# Patient Record
Sex: FEMALE | Race: WHITE | NOT HISPANIC OR LATINO | URBAN - METROPOLITAN AREA
[De-identification: names, ages, dates, MRNs, and addresses within clinical notes are randomized per-mention and may not be internally consistent; named-entity substitution may affect disease eponyms.]

---

## 2018-08-07 NOTE — TELEPHONE ENCOUNTER
FUTURE VISIT INFORMATION      FUTURE VISIT INFORMATION:    Date: 8/10/18    Time: 1340    Location: [facility]  REFERRAL INFORMATION:    Referring provider:  UNKNOWN    Referring providers clinic:  UNKNOWN    Reason for visit/diagnosis  DDD    RECORDS REQUESTED FROM:       Clinic name Comments Records Status Imaging Status                                         RECORDS STATUS      RECORDS RECEIVED FROM: [place]   DATE RECEIVED: 8/7/18   NOTES STATUS DETAILS   OFFICE NOTE from referring provider N/A    OFFICE NOTE from other specialist Care Everywhere 11/1/13-8/10/16   DISCHARGE SUMMARY from hospital N/A    DISCHARGE REPORT from the ER N/A    OPERATIVE REPORT RECEIVED April 2016 November 2013 December 2014     MEDICATION LIST Care Everywhere    IMPLANT RECORD/STICKER N/A    LABS     CBC/DIFF N/A    CULTURES N/A    DEXA N/A    MRI RECEIVED 7/27/17*10/13/16*6/5/16*5/19/16*3/17/16*10/28/14*5/7/13*   CT SCAN RECEIVED 10/8/10   XRAYS (IMAGES & REPORTS) RECEIVED 8/10/16*7/11/16*4/21/16*3/14/16*12/16/14*11/4/13*10/30/13*3/5/13*   TUMOR     PATHOLOGY  Slides & report N/A

## 2018-08-09 DIAGNOSIS — M51.369 DDD (DEGENERATIVE DISC DISEASE), LUMBAR: Primary | ICD-10-CM

## 2018-08-09 NOTE — PROGRESS NOTES
Spine Surgery Consultation    REFERRING PHYSICIAN: Referred Self   PRIMARY CARE PHYSICIAN: Tad Ramirez           Chief Complaint:   Low back and right leg pain    History of Present Illness:  Symptom Profile Including: location of symptoms, onset, severity, exacerbating/alleviating factors, previous treatments:        Saige Haddad is a 38 year old female who presents for evaluation of the above.  The patient has long-standing low back pain since the age of 16.  She has been treated over the years with various courses of oral pain medications, with good relief for periods of time.  She developed significant low back pain extending down her lateral thigh and lateral leg about 5-6 years ago.  She developed some numbness and tingling and was diagnosed with a L5-S1 disc herniation.  She underwent a right L5 partial hemilaminectomy and microdiscectomy with Dr. mcdaniels in the [place] system in 2013.  She initially did well after the surgery, developed recurrence of her symptoms.  She underwent redo of her partial hemilaminectomy, and discectomy in 2014.  She reports that again she did well after the surgery, but they have had a recurrence of her symptoms.  She underwent revision decompression and microdiscectomy in 2016, and had no relief of her symptoms after this.    Since that time she continues to have right-sided low back pain extending down her lateral thigh and lateral leg to the dorsum of her foot into the great toe in an L5 distribution.  It is worse with activity and ambulation, and better with rest.  She does note some diffuse numbness in her calf, and the entirety of her foot.  She reports that her problem is 50% low back pain, and 50% leg pain.  She has been treated with massage therapy, and has been seen in a pain clinic.  She reports she has gotten various injections most recently about 1 year ago.  She reports transient about 1-2 hour relief of symptoms after an injection, but no lasting relief.   "She comes in today for further recommendations for her low back and leg pain.  She has chronic diarrhea, but denies any issues with bowel or bladder incontinence.  She has not had any recent weight loss or weight gain.         Past Medical History:   Patient denies any significant past medical history.         Past Surgical History:   As noted above previous L5-S1 hemilaminectomy and discectomies.  Tubal ligation  No other intra-abdominal surgeries.         Social History:   She smokes 1 pack per day of cigarettes, uses alcohol occasionally.  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  She denies any other drug use.  Patient was recently laid off from her job and [employer].  She has a relatively unstable housing situation, and is bouncing between 3 different friends houses.    The patient does note that recently she lost her job.  She has been dealing with some anger and psychiatric issues, and is scheduled to see a psychiatrist next week.         Family History:   Patient denies any problems with anesthesia, bleeding or clotting.         Allergies:   No known drug allergies         Medications:   No current medications.            Review of Systems:     A 10 point ROS was performed and reviewed. Specific responses to these questions are noted at the end of the document.         Physical Exam:   Vitals: Ht 1.676 m (5' 6\")  Wt 93.4 kg (205 lb 14.4 oz)  BMI 33.23 kg/m2  Constitutional: awake, alert, cooperative, no apparent distress, appears stated age.    Eyes: The sclera are white.  Ears, Nose, Throat: The trachea is midline.  Psychiatric: The patient has a normal affect.  Respiratory: breathing non-labored  Cardiovascular: The extremities are warm and perfused.  Skin: no obvious rashes or lesions.  Musculoskeletal, Neurologic, and Spine:   Cervical spine:       Lumbar Spine:    Appearance - No gross stepoffs or deformities    Motor -     L2-3: Hip flexion 5/5 L and 5/5 R " strength          L3/4:  Knee extension L 5/5 and R 5/5 strength         L4/5:  Foot dorsiflexion R 5/5 L 5/5 and       EHL dorsiflexion R 4/5 L 4/5 strength         S1:  Plantarflexion/Peroneal Muscles  L 5/5 and R 5/5 strength    Sensation: intact to light touch L3-S1 distribution BLE, patient reports decreased sensation diffusely in the right lower extremity in the L4, L5, and S1 distributions.      Neurologic:      REFLEXES Left Right   Patella 1+ 1+   Ankle jerk 1+ 1+   Babinski No upgoing great toe No upgoing great toe   Clonus 0 beats 0 beats     Hip Exam:  No pain with hip log roll and no tenderness over the greater trochanters.    Alignment:  Patient stands with a neutral standing sagittal balance.    Tender to palpation over the right PSIS.  Negative Fabricio, thigh thrust, pelvic gapping.    Positive passive straight leg raise on the right for L5 pain.           Imaging:   We ordered and independently reviewed new radiographs at this clinic visit. The results were discussed with the patient.  Findings include:    AP and lateral of the lumbar spine with flexion-extension views from today's date are reviewed.  There is disc space narrowing at L5-S1 and L4-L5.  No significant listhesis.  Posterior spondylosis.    7/27/2017 Lumbar MRI: Severe spondylosis L5-S1 with complete disc space height loss and severe right greater than left foraminal stenosis.  Moderate spondylosis L4-5 without severe stenosis.             Assessment and Plan:   Assessment:  38 year old female with L5-S1 spondylosis and severe right greater than left foraminal stenosis, right L5 radiculopathy.     Plan:  1. Dr. Duong had a good discussion with patient about her findings today.  We reviewed her imaging with her and discussed the likely etiology of her back pain and leg pain.  2. Dr. Duong explained the foraminal stenosis, and explain why the patient likely has not seen significant improvement with microdiscectomy alone.  I think that  given her severe foraminal stenosis that she will not have relief of her leg pain symptoms without either a facetectomy or interbody device, both of which would require a fusion.  Thus, a further revision decompression is not indicated.  3. Patient has undergone conservative management and continues to have radiculopathy  4. Given the patient's continued foraminal stenosis, a L5-S1 anterior lumbar interbody fusion, percutaneous pedicle screw and frederic placement is indicated.  We discussed that this procedure is mainly done for leg pain.  It may help with her back pain given her spondylosis, but this is less predictable than relief of the leg pain.  We discussed that it also may help some of her numbness, but that it is possible that the nerve is been damaging that this would not significantly improve.  Dr. Duong discussed the risk benefits and alternatives to the above procedure.  5. Specifically the risks include the risk of general anesthesia, bleeding, infection, damage to intra-abdominal contents, failure to fuse, nerve root damage, spinal fluid leak, and failure of the patient's symptoms to completely resolved.    6. The patient would like to proceed with surgery. We will refer the patient to the PAC clinic for preoperative evaluation.  We will also update her MRI and get a CT given her MRIs from over one year ago.    7. Discussed smoking cessation with the patient. The patient will need to be off of nicotine for 3 weeks prior to surgery, and we encouraged her to continue this in the 6 month postoperative period to increase her rate of fusion.  The patient understands, and indicated that she will in fact quit smoking.  8. Dr. Duong will see the patient back before surgery to answer any questions and further discuss the details.  The patient was in agreement with this plan and all questions were answered.    The patient was seen and discussed with staff surgeon Dr. Duong who was in agreement with the above  assessment and plan.     Shabbir Wren MD  PGY-4  Orthopaedic Surgery  [phone]    Attending MD (Dr. Erick Duong) :  I reviewed and verified the history and physical exam of the patient and discussed the patient's management with the other clinical providers involved in this patient's care including any involved residents or physicians assistants. I reviewed the above note and agree with the documented findings and plan of care, which were communicated to the patient.      Erick Duong MD      Answers for HPI/ROS submitted by the patient on 8/10/2018   General Symptoms: Yes  Skin Symptoms: No  HENT Symptoms: No  EYE SYMPTOMS: No  HEART SYMPTOMS: No  LUNG SYMPTOMS: No  INTESTINAL SYMPTOMS: Yes  URINARY SYMPTOMS: No  GYNECOLOGIC SYMPTOMS: Yes  BREAST SYMPTOMS: No  SKELETAL SYMPTOMS: Yes  BLOOD SYMPTOMS: No  NERVOUS SYSTEM SYMPTOMS: Yes  MENTAL HEALTH SYMPTOMS: Yes  Fever: No  Loss of appetite: No  Weight loss: No  Weight gain: Yes  Fatigue: Yes  Night sweats: No  Chills: No  Increased stress: Yes  Excessive hunger: Yes  Excessive thirst: No  Feeling hot or cold when others believe the temperature is normal: No  Loss of height: No  Post-operative complications: No  Surgical site pain: No  Hallucinations: No  Change in or Loss of Energy: No  Hyperactivity: No  Confusion: No  Heart burn or indigestion: Yes  Nausea: Yes  Vomiting: No  Abdominal pain: No  Bloating: Yes  Constipation: No  Diarrhea: Yes  Blood in stool: No  Black stools: No  Rectal or Anal pain: No  Fecal incontinence: No  Yellowing of skin or eyes: No  Vomit with blood: No  Change in stools: No  Back pain: Yes  Muscle aches: Yes  Neck pain: No  Swollen joints: No  Joint pain: No  Bone pain: No  Muscle cramps: Yes  Muscle weakness: No  Joint stiffness: No  Bone fracture: No  Trouble with coordination: No  Dizziness or trouble with balance: No  Fainting or black-out spells: No  Memory loss: No  Headache: Yes  Seizures: No  Speech  problems: No  Tingling: Yes  Tremor: No  Weakness: Yes  Difficulty walking: Yes  Paralysis: No  Numbness: Yes  Bleeding or spotting between periods: Yes  Heavy or painful periods: No  Irregular periods: Yes  Vaginal discharge: Yes  Hot flashes: Yes  Vaginal dryness: No  Genital ulcers: No  Reduced libido: No  Painful intercourse: No  Difficulty with sexual arousal: No  Post-menopausal bleeding: No  Nervous or Anxious: No  Depression: No  Trouble sleeping: No  Trouble thinking or concentrating: No  Mood changes: Yes  Panic attacks: No  PHQ-2 Score: 4  If you checked off any problems, how difficult have these problems made it for you to do your work, take care of things at home, or get along with other people?: Very difficult  PHQ9 TOTAL SCORE: 17

## 2018-08-10 ENCOUNTER — OFFICE VISIT (OUTPATIENT)
Dept: ORTHOPEDICS | Facility: CLINIC | Age: 38
End: 2018-08-10
Payer: COMMERCIAL

## 2018-08-10 ENCOUNTER — RADIANT APPOINTMENT (OUTPATIENT)
Dept: GENERAL RADIOLOGY | Facility: CLINIC | Age: 38
End: 2018-08-10
Attending: ORTHOPAEDIC SURGERY
Payer: COMMERCIAL

## 2018-08-10 ENCOUNTER — PRE VISIT (OUTPATIENT)
Dept: ORTHOPEDICS | Facility: CLINIC | Age: 38
End: 2018-08-10

## 2018-08-10 VITALS — BODY MASS INDEX: 33.09 KG/M2 | WEIGHT: 205.9 LBS | HEIGHT: 66 IN

## 2018-08-10 DIAGNOSIS — Z13.0 SCREENING FOR ENDOCRINE, NUTRITIONAL, METABOLIC AND IMMUNITY DISORDER: ICD-10-CM

## 2018-08-10 DIAGNOSIS — Z13.29 SCREENING FOR ENDOCRINE, NUTRITIONAL, METABOLIC AND IMMUNITY DISORDER: ICD-10-CM

## 2018-08-10 DIAGNOSIS — Z13.21 SCREENING FOR ENDOCRINE, NUTRITIONAL, METABOLIC AND IMMUNITY DISORDER: ICD-10-CM

## 2018-08-10 DIAGNOSIS — Z13.228 SCREENING FOR ENDOCRINE, NUTRITIONAL, METABOLIC AND IMMUNITY DISORDER: ICD-10-CM

## 2018-08-10 DIAGNOSIS — M48.062 SPINAL STENOSIS, LUMBAR REGION, WITH NEUROGENIC CLAUDICATION: Primary | ICD-10-CM

## 2018-08-10 DIAGNOSIS — M48.062 SPINAL STENOSIS, LUMBAR REGION, WITH NEUROGENIC CLAUDICATION: ICD-10-CM

## 2018-08-10 DIAGNOSIS — M51.369 DDD (DEGENERATIVE DISC DISEASE), LUMBAR: ICD-10-CM

## 2018-08-10 LAB
MRSA DNA SPEC QL NAA+PROBE: NEGATIVE
SPECIMEN SOURCE: NORMAL

## 2018-08-10 RX ORDER — IBUPROFEN 200 MG
1 TABLET ORAL
COMMUNITY
Start: 2016-06-20

## 2018-08-10 RX ORDER — CHOLECALCIFEROL (VITAMIN D3) 50 MCG
2000 TABLET ORAL DAILY
Qty: 100 TABLET | Refills: 3 | Status: SHIPPED | OUTPATIENT
Start: 2018-08-10

## 2018-08-10 RX ORDER — CALCIUM CARBONATE 500(1250)
1 TABLET ORAL DAILY
Qty: 90 TABLET | Refills: 1 | Status: SHIPPED | OUTPATIENT
Start: 2018-08-10

## 2018-08-10 ASSESSMENT — ENCOUNTER SYMPTOMS
NUMBNESS: 1
MUSCLE WEAKNESS: 0
NECK PAIN: 0
TREMORS: 0
POLYDIPSIA: 0
WEIGHT GAIN: 1
JAUNDICE: 0
ARTHRALGIAS: 0
CONSTIPATION: 0
WEIGHT LOSS: 0
VOMITING: 0
MYALGIAS: 1
DECREASED CONCENTRATION: 0
DECREASED APPETITE: 0
ABDOMINAL PAIN: 0
WEAKNESS: 1
NAUSEA: 1
DEPRESSION: 0
SEIZURES: 0
INSOMNIA: 0
DECREASED LIBIDO: 0
JOINT SWELLING: 0
HALLUCINATIONS: 0
MEMORY LOSS: 0
TINGLING: 1
RECTAL PAIN: 0
NIGHT SWEATS: 0
HEARTBURN: 1
INCREASED ENERGY: 0
CHILLS: 0
STIFFNESS: 0
DIZZINESS: 0
DIARRHEA: 1
HEADACHES: 1
FEVER: 0
LOSS OF CONSCIOUSNESS: 0
PARALYSIS: 0
HOT FLASHES: 1
PANIC: 0
BLOOD IN STOOL: 0
MUSCLE CRAMPS: 1
POLYPHAGIA: 1
DISTURBANCES IN COORDINATION: 0
ALTERED TEMPERATURE REGULATION: 0
BACK PAIN: 1
SPEECH CHANGE: 0
NERVOUS/ANXIOUS: 0
BOWEL INCONTINENCE: 0
FATIGUE: 1
BLOATING: 1

## 2018-08-10 ASSESSMENT — PATIENT HEALTH QUESTIONNAIRE - PHQ9
SUM OF ALL RESPONSES TO PHQ QUESTIONS 1-9: 17
SUM OF ALL RESPONSES TO PHQ QUESTIONS 1-9: 17
10. IF YOU CHECKED OFF ANY PROBLEMS, HOW DIFFICULT HAVE THESE PROBLEMS MADE IT FOR YOU TO DO YOUR WORK, TAKE CARE OF THINGS AT HOME, OR GET ALONG WITH OTHER PEOPLE: VERY DIFFICULT

## 2018-08-10 NOTE — LETTER
8/10/2018      RE: Saige Haddad  [id number]  [facility] [address]       Spine Surgery Consultation    REFERRING PHYSICIAN: Referred Self   PRIMARY CARE PHYSICIAN: Tad Ramirez           Chief Complaint:   Low back and right leg pain    History of Present Illness:  Symptom Profile Including: location of symptoms, onset, severity, exacerbating/alleviating factors, previous treatments:        Saige Haddad is a 38 year old female who presents for evaluation of the above.  The patient has long-standing low back pain since the age of 16.  She has been treated over the years with various courses of oral pain medications, with good relief for periods of time.  She developed significant low back pain extending down her lateral thigh and lateral leg about 5-6 years ago.  She developed some numbness and tingling and was diagnosed with a L5-S1 disc herniation.  She underwent a right L5 partial hemilaminectomy and microdiscectomy with Dr. mcdaniels in the [place] system in 2013.  She initially did well after the surgery, developed recurrence of her symptoms.  She underwent redo of her partial hemilaminectomy, and discectomy in 2014.  She reports that again she did well after the surgery, but they have had a recurrence of her symptoms.  She underwent revision decompression and microdiscectomy in 2016, and had no relief of her symptoms after this.    Since that time she continues to have right-sided low back pain extending down her lateral thigh and lateral leg to the dorsum of her foot into the great toe in an L5 distribution.  It is worse with activity and ambulation, and better with rest.  She does note some diffuse numbness in her calf, and the entirety of her foot.  She reports that her problem is 50% low back pain, and 50% leg pain.  She has been treated with massage therapy, and has been seen in a pain clinic.  She reports she has gotten various injections most recently about 1 year ago.  She reports  "transient about 1-2 hour relief of symptoms after an injection, but no lasting relief.  She comes in today for further recommendations for her low back and leg pain.  She has chronic diarrhea, but denies any issues with bowel or bladder incontinence.  She has not had any recent weight loss or weight gain.         Past Medical History:   Patient denies any significant past medical history.         Past Surgical History:   As noted above previous L5-S1 hemilaminectomy and discectomies.  Tubal ligation  No other intra-abdominal surgeries.         Social History:   She smokes 1 pack per day of cigarettes, uses alcohol occasionally.  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  She denies any other drug use.  Patient was recently laid off from her job and [employer].  She has a relatively unstable housing situation, and is bouncing between 3 different friends houses.    The patient does note that recently she lost her job.  She has been dealing with some anger and psychiatric issues, and is scheduled to see a psychiatrist next week.         Family History:   Patient denies any problems with anesthesia, bleeding or clotting.         Allergies:   No known drug allergies         Medications:   No current medications.            Review of Systems:     A 10 point ROS was performed and reviewed. Specific responses to these questions are noted at the end of the document.         Physical Exam:   Vitals: Ht 1.676 m (5' 6\")  Wt 93.4 kg (205 lb 14.4 oz)  BMI 33.23 kg/m2  Constitutional: awake, alert, cooperative, no apparent distress, appears stated age.    Eyes: The sclera are white.  Ears, Nose, Throat: The trachea is midline.  Psychiatric: The patient has a normal affect.  Respiratory: breathing non-labored  Cardiovascular: The extremities are warm and perfused.  Skin: no obvious rashes or lesions.  Musculoskeletal, Neurologic, and Spine:   Cervical spine:       Lumbar Spine:    Appearance - " No gross stepoffs or deformities    Motor -     L2-3: Hip flexion 5/5 L and 5/5 R strength          L3/4:  Knee extension L 5/5 and R 5/5 strength         L4/5:  Foot dorsiflexion R 5/5 L 5/5 and       EHL dorsiflexion R 4/5 L 4/5 strength         S1:  Plantarflexion/Peroneal Muscles  L 5/5 and R 5/5 strength    Sensation: intact to light touch L3-S1 distribution BLE, patient reports decreased sensation diffusely in the right lower extremity in the L4, L5, and S1 distributions.      Neurologic:      REFLEXES Left Right   Patella 1+ 1+   Ankle jerk 1+ 1+   Babinski No upgoing great toe No upgoing great toe   Clonus 0 beats 0 beats     Hip Exam:  No pain with hip log roll and no tenderness over the greater trochanters.    Alignment:  Patient stands with a neutral standing sagittal balance.    Tender to palpation over the right PSIS.  Negative Fabricio, thigh thrust, pelvic gapping.    Positive passive straight leg raise on the right for L5 pain.           Imaging:   We ordered and independently reviewed new radiographs at this clinic visit. The results were discussed with the patient.  Findings include:    AP and lateral of the lumbar spine with flexion-extension views from today's date are reviewed.  There is disc space narrowing at L5-S1 and L4-L5.  No significant listhesis.  Posterior spondylosis.    7/27/2017 Lumbar MRI: Severe spondylosis L5-S1 with complete disc space height loss and severe right greater than left foraminal stenosis.  Moderate spondylosis L4-5 without severe stenosis.             Assessment and Plan:   Assessment:  38 year old female with L5-S1 spondylosis and severe right greater than left foraminal stenosis, right L5 radiculopathy.     Plan:  1. Dr. Duong had a good discussion with patient about her findings today.  We reviewed her imaging with her and discussed the likely etiology of her back pain and leg pain.  2. Dr. Duong explained the foraminal stenosis, and explain why the patient likely  has not seen significant improvement with microdiscectomy alone.  I think that given her severe foraminal stenosis that she will not have relief of her leg pain symptoms without either a facetectomy or interbody device, both of which would require a fusion.  Thus, a further revision decompression is not indicated.  3. Patient has undergone conservative management and continues to have radiculopathy  4. Given the patient's continued foraminal stenosis, a L5-S1 anterior lumbar interbody fusion, percutaneous pedicle screw and frederic placement is indicated.  We discussed that this procedure is mainly done for leg pain.  It may help with her back pain given her spondylosis, but this is less predictable than relief of the leg pain.  We discussed that it also may help some of her numbness, but that it is possible that the nerve is been damaging that this would not significantly improve.  Dr. Duong discussed the risk benefits and alternatives to the above procedure.  5. Specifically the risks include the risk of general anesthesia, bleeding, infection, damage to intra-abdominal contents, failure to fuse, nerve root damage, spinal fluid leak, and failure of the patient's symptoms to completely resolved.    6. The patient would like to proceed with surgery. We will refer the patient to the PAC clinic for preoperative evaluation.  We will also update her MRI and get a CT given her MRIs from over one year ago.    7. Discussed smoking cessation with the patient. The patient will need to be off of nicotine for 3 weeks prior to surgery, and we encouraged her to continue this in the 6 month postoperative period to increase her rate of fusion.  The patient understands, and indicated that she will in fact quit smoking.  8. Dr. Duong will see the patient back before surgery to answer any questions and further discuss the details.  The patient was in agreement with this plan and all questions were answered.    The patient was seen and  discussed with staff surgeon Dr. Duong who was in agreement with the above assessment and plan.     Shabbir Wren MD  PGY-4  Orthopaedic Surgery  [phone]    Attending MD (Dr. Erick Duong) :  I reviewed and verified the history and physical exam of the patient and discussed the patient's management with the other clinical providers involved in this patient's care including any involved residents or physicians assistants. I reviewed the above note and agree with the documented findings and plan of care, which were communicated to the patient.      Erick Duong MD

## 2018-08-10 NOTE — MR AVS SNAPSHOT
After Visit Summary   8/10/2018    Saige Haddad    MRN: [id number]           Patient Information     Date Of Birth          1980        Visit Information        Provider Department      8/10/2018 1:40 PM Erick Duong MD [facility]        Today's Diagnoses     Spinal stenosis, lumbar region, with neurogenic claudication    -  1    Screening for endocrine, nutritional, metabolic and immunity disorder           Follow-ups after your visit        Additional Services     PAC Visit Referral (For [facility] Only)       Does this visit require an Anesthesia consult?  L5-S1 ALIF and Perc Screws    H&P done by:  N/A and Other (Specify): PAC      Please be aware that coverage of these services is subject to the terms and limitations of your health insurance plan.  Call member services at your health plan with any benefit or coverage questions.      Please bring the following to your appointment:  >>   Any x-rays, CTs or MRIs which have been performed.  Contact the facility where they were done to arrange for  prior to your scheduled appointment.  Any new CT, MRI or other procedures ordered by your specialist must be performed at a [place] facility or coordinated by your clinic's referral office.    >>   List of current medications  >>   This referral request   >>   Any documents/labs given to you for this referral                  Your next 10 appointments already scheduled     Aug 31, 2018 10:30 AM CDT   (Arrive by 10:15 AM)   PAC EVALUATION with LESLIE Cortes CNP   [facility] ([facility])    [address] [place] [id number] [phone]            Aug 31, 2018 11:30 AM CDT   (Arrive by 11:15 AM)   PAC RN ASSESSMENT with Lucia Pac Rn   [facility] ([facility])    [address] [place] [id number]  "  [phone]            Aug 31, 2018 12:00 PM CDT   (Arrive by 11:45 AM)   PAC Anesthesia Consult with  Pac Anesthesiologist   [facility] ([facility])    [address] [place] [id number] [phone]              Who to contact     Please call your clinic at [phone] to:    Ask questions about your health    Make or cancel appointments    Discuss your medicines    Learn about your test results    Speak to your doctor            Additional Information About Your Visit        Kytehart Information     CapsoVision is an electronic gateway that provides easy, online access to your medical records. With CapsoVision, you can request a clinic appointment, read your test results, renew a prescription or communicate with your care team.     To sign up for CapsoVision visit the website at [email or web address]   You will be asked to enter the access code listed below, as well as some personal information. Please follow the directions to create your username and password.     Your access code is: [email or web address]  Expires: 2018  6:30 AM     Your access code will  in 90 days. If you need help or a new code, please contact your [facility] or call [phone] for assistance.        Care EveryWhere ID     This is your Care EveryWhere ID. This could be used by other organizations to access your [place] medical records  [id number]        Your Vitals Were     Height BMI (Body Mass Index)                1.676 m (5' 6\") 33.23 kg/m2           Blood Pressure from Last 3 Encounters:   No data found for BP    Weight from Last 3 Encounters:   08/10/18 93.4 kg (205 lb 14.4 oz)              We Performed the Following     PAC Visit Referral (For [facility] Only)     Ilana-Operative Worksheet          Today's Medication Changes          These changes are accurate as of 8/10/18 11:59 PM.  If you have any questions, ask your nurse " or doctor.               Start taking these medicines.        Dose/Directions    calcium carbonate 500 MG tablet   Commonly known as:  OS- mg [place] IRA. Ca   Used for:  Spinal stenosis, lumbar region, with neurogenic claudication   Started by:  Erick Duong MD        Dose:  1 tablet   Take 1 tablet (500 mg) by mouth daily   Quantity:  90 tablet   Refills:  1       vitamin D 2000 units tablet   Used for:  Spinal stenosis, lumbar region, with neurogenic claudication, Screening for endocrine, nutritional, metabolic and immunity disorder   Started by:  Erick Duong MD        Dose:  2000 Units   Take 2,000 Units by mouth daily   Quantity:  100 tablet   Refills:  3            Where to get your medicines      Some of these will need a paper prescription and others can be bought over the counter.  Ask your nurse if you have questions.     Bring a paper prescription for each of these medications     calcium carbonate 500 MG tablet    vitamin D 2000 units tablet                Primary Care Provider Office Phone # Fax #    Tad SINGER Betomarie [phone]       [facility] [address] [facility] [address]  [facility] [address]        Equal Access to Services     [facility] AH: Hadii sterling ku hadasho Soomaali, waaxda luqadaha, qaybta kaalmada adeegyada, waxay meliain hayshawn otto . So [facility] [phone].    ATENCIÓN: Si habla español, tiene a dempsey disposición servicios gratuitos de asistencia lingüística. [facility] [phone].    We comply with applicable federal civil rights laws and [place] laws. We do not discriminate on the basis of race, color, national origin, age, disability, sex, sexual orientation, or gender identity.            Thank you!     Thank you for choosing [facility]  for your care. Our goal is always to provide you with excellent care. Hearing back from our patients is one way we can continue to improve our services. Please take a few minutes  to complete the written survey that you may receive in the mail after your visit with us. Thank you!             Your Updated Medication List - Protect others around you: Learn how to safely use, store and throw away your medicines at www.disposemymeds.org.          This list is accurate as of 8/10/18 11:59 PM.  Always use your most recent med list.                   Brand Name Dispense Instructions for use Diagnosis    calcium carbonate 500 MG tablet    OS- mg [place]. Ca    90 tablet    Take 1 tablet (500 mg) by mouth daily    Spinal stenosis, lumbar region, with neurogenic claudication       ibuprofen 200 MG tablet    ADVIL/MOTRIN     Take 1 tablet by mouth        vitamin D 2000 units tablet     100 tablet    Take 2,000 Units by mouth daily    Spinal stenosis, lumbar region, with neurogenic claudication, Screening for endocrine, nutritional, metabolic and immunity disorder

## 2018-08-10 NOTE — NURSING NOTE
Teaching Flowsheet   Relevant Diagnosis: lumbar radiculopathy  Teaching Topic: preop L5-S1 ALIF and Percutaneous screws    Saige is presently staying with her mom or daughter in [place] or [place], has people to help her after surgery.  She is a smoker for 25 yrs, will stop smoking before her next visit with Dr Duong and PAC.  She takes ibuprofen minimally, about 3 tabs/wk.  MRSA screen swab sent to lab.     Person(s) involved in teaching:   Patient     Motivation Level:  Asks Questions: Yes  Eager to Learn: Yes  Cooperative: Yes  Receptive (willing/able to accept information): Yes  Any cultural factors/Buddhism beliefs that may influence understanding or compliance? No  Comments:      Patient demonstrates understanding of the following:  Reason for the appointment, diagnosis and treatment plan: Yes  Knowledge of proper use of medications and conditions for which they are ordered (with special attention to potential side effects or drug interactions): Yes  Which situations necessitate calling provider and whom to contact: Yes       Teaching Concerns Addressed:   Comments:      Proper use and care of dressings (medical equip, care aids, etc.): Yes  Nutritional needs and diet plan: Yes  Pain management techniques: Yes  Wound Care: Yes  How and/when to access community resources: Yes     Instructional Materials Used/Given: preop surgery spine pkt     Time spent with patient: 15 minutes.

## 2018-08-10 NOTE — LETTER
8/10/2018       RE: Siage Haddad  [address]  [facility] [address]     Dear Colleague,    Thank you for referring your patient, Saige Haddad, to the [facility]. Please see a copy of my visit note below.    Spine Surgery Consultation    REFERRING PHYSICIAN: Referred Self   PRIMARY CARE PHYSICIAN: Tad Ramirez           Chief Complaint:   Low back and right leg pain    History of Present Illness:  Symptom Profile Including: location of symptoms, onset, severity, exacerbating/alleviating factors, previous treatments:        Saige Haddad is a 38 year old female who presents for evaluation of the above.  The patient has long-standing low back pain since the age of 16.  She has been treated over the years with various courses of oral pain medications, with good relief for periods of time.  She developed significant low back pain extending down her lateral thigh and lateral leg about 5-6 years ago.  She developed some numbness and tingling and was diagnosed with a L5-S1 disc herniation.  She underwent a right L5 partial hemilaminectomy and microdiscectomy with Dr. mcdaniels in the [facility] system in 2013.  She initially did well after the surgery, developed recurrence of her symptoms.  She underwent redo of her partial hemilaminectomy, and discectomy in 2014.  She reports that again she did well after the surgery, but they have had a recurrence of her symptoms.  She underwent revision decompression and microdiscectomy in 2016, and had no relief of her symptoms after this.    Since that time she continues to have right-sided low back pain extending down her lateral thigh and lateral leg to the dorsum of her foot into the great toe in an L5 distribution.  It is worse with activity and ambulation, and better with rest.  She does note some diffuse numbness in her calf, and the entirety of her foot.  She reports that her problem is 50% low  "back pain, and 50% leg pain.  She has been treated with massage therapy, and has been seen in a pain clinic.  She reports she has gotten various injections most recently about 1 year ago.  She reports transient about 1-2 hour relief of symptoms after an injection, but no lasting relief.  She comes in today for further recommendations for her low back and leg pain.  She has chronic diarrhea, but denies any issues with bowel or bladder incontinence.  She has not had any recent weight loss or weight gain.         Past Medical History:   Patient denies any significant past medical history.         Past Surgical History:   As noted above previous L5-S1 hemilaminectomy and discectomies.  Tubal ligation  No other intra-abdominal surgeries.         Social History:   She smokes 1 pack per day of cigarettes, uses alcohol occasionally.  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  She denies any other drug use.  Patient was recently laid off from her job and [employer].  She has a relatively unstable housing situation, and is bouncing between 3 different friends houses.    The patient does note that recently she lost her job.  She has been dealing with some anger and psychiatric issues, and is scheduled to see a psychiatrist next week.         Family History:   Patient denies any problems with anesthesia, bleeding or clotting.         Allergies:   No known drug allergies         Medications:   No current medications.            Review of Systems:     A 10 point ROS was performed and reviewed. Specific responses to these questions are noted at the end of the document.         Physical Exam:   Vitals: Ht 1.676 m (5' 6\")  Wt 93.4 kg (205 lb 14.4 oz)  BMI 33.23 kg/m2  Constitutional: awake, alert, cooperative, no apparent distress, appears stated age.    Eyes: The sclera are white.  Ears, Nose, Throat: The trachea is midline.  Psychiatric: The patient has a normal affect.  Respiratory: " breathing non-labored  Cardiovascular: The extremities are warm and perfused.  Skin: no obvious rashes or lesions.  Musculoskeletal, Neurologic, and Spine:   Cervical spine:       Lumbar Spine:    Appearance - No gross stepoffs or deformities    Motor -     L2-3: Hip flexion 5/5 L and 5/5 R strength          L3/4:  Knee extension L 5/5 and R 5/5 strength         L4/5:  Foot dorsiflexion R 5/5 L 5/5 and       EHL dorsiflexion R 4/5 L 4/5 strength         S1:  Plantarflexion/Peroneal Muscles  L 5/5 and R 5/5 strength    Sensation: intact to light touch L3-S1 distribution BLE, patient reports decreased sensation diffusely in the right lower extremity in the L4, L5, and S1 distributions.      Neurologic:      REFLEXES Left Right   Patella 1+ 1+   Ankle jerk 1+ 1+   Babinski No upgoing great toe No upgoing great toe   Clonus 0 beats 0 beats     Hip Exam:  No pain with hip log roll and no tenderness over the greater trochanters.    Alignment:  Patient stands with a neutral standing sagittal balance.    Tender to palpation over the right PSIS.  Negative Fabricio, thigh thrust, pelvic gapping.    Positive passive straight leg raise on the right for L5 pain.           Imaging:   We ordered and independently reviewed new radiographs at this clinic visit. The results were discussed with the patient.  Findings include:    AP and lateral of the lumbar spine with flexion-extension views from today's date are reviewed.  There is disc space narrowing at L5-S1 and L4-L5.  No significant listhesis.  Posterior spondylosis.    7/27/2017 Lumbar MRI: Severe spondylosis L5-S1 with complete disc space height loss and severe right greater than left foraminal stenosis.  Moderate spondylosis L4-5 without severe stenosis.             Assessment and Plan:   Assessment:  38 year old female with L5-S1 spondylosis and severe right greater than left foraminal stenosis, right L5 radiculopathy.     Plan:  1. Dr. Duong had a good discussion with patient  about her findings today.  We reviewed her imaging with her and discussed the likely etiology of her back pain and leg pain.  2. Dr. Duong explained the foraminal stenosis, and explain why the patient likely has not seen significant improvement with microdiscectomy alone.  I think that given her severe foraminal stenosis that she will not have relief of her leg pain symptoms without either a facetectomy or interbody device, both of which would require a fusion.  Thus, a further revision decompression is not indicated.  3. Patient has undergone conservative management and continues to have radiculopathy  4. Given the patient's continued foraminal stenosis, a L5-S1 anterior lumbar interbody fusion, percutaneous pedicle screw and frederic placement is indicated.  We discussed that this procedure is mainly done for leg pain.  It may help with her back pain given her spondylosis, but this is less predictable than relief of the leg pain.  We discussed that it also may help some of her numbness, but that it is possible that the nerve is been damaging that this would not significantly improve.  Dr. Duong discussed the risk benefits and alternatives to the above procedure.  5. Specifically the risks include the risk of general anesthesia, bleeding, infection, damage to intra-abdominal contents, failure to fuse, nerve root damage, spinal fluid leak, and failure of the patient's symptoms to completely resolved.    6. The patient would like to proceed with surgery. We will refer the patient to the PAC clinic for preoperative evaluation.  We will also update her MRI and get a CT given her MRIs from over one year ago.    7. Discussed smoking cessation with the patient. The patient will need to be off of nicotine for 3 weeks prior to surgery, and we encouraged her to continue this in the 6 month postoperative period to increase her rate of fusion.  The patient understands, and indicated that she will in fact quit smoking.  8.   Ad will see the patient back before surgery to answer any questions and further discuss the details.  The patient was in agreement with this plan and all questions were answered.    The patient was seen and discussed with staff surgeon Dr. Duong who was in agreement with the above assessment and plan.     Shabbir Wren MD  PGY-4  Orthopaedic Surgery  [phone]    Attending MD (Dr. Erick Duong) :  I reviewed and verified the history and physical exam of the patient and discussed the patient's management with the other clinical providers involved in this patient's care including any involved residents or physicians assistants. I reviewed the above note and agree with the documented findings and plan of care, which were communicated to the patient.        Again, thank you for allowing me to participate in the care of your patient.      Sincerely,    Erick Duong MD

## 2018-08-10 NOTE — NURSING NOTE
"Reason For Visit:   Chief Complaint   Patient presents with     Lower Back - Pain       Primary MD: Tad Ramirez  Ref. MD: Self    ?  No  Date of injury: Ongoing pain since she was 16 years old. Over 5 years ago, pain increased.   Type of surgery: Previous History of 4 Laminectomy Surgeries  Smoker: Yes  Request smoking cessation information: No    Ht 1.676 m (5' 6\")  Wt 93.4 kg (205 lb 14.4 oz)  BMI 33.23 kg/m2    Pain Assessment  Patient Currently in Pain: Yes  Primary Pain Location: Back  Pain Orientation: Right  Pain Descriptors: Shooting, Sharp, Numbness, Tingling (back side of right leg)  Alleviating Factors: NSAIDS, Heat, Ice  Aggravating Factors: Movement    Oswestry (MARIO) Questionnaire    OSWESTRY DISABILITY INDEX 8/10/2018   Count 10   Sum 25   Oswestry Score (%) 50          Visual Analog Pain Scale  Back Pain Scale 0-10: 7  Right leg pain: 6.5    Promis 10 Assessment    PROMIS 10 8/10/2018   In general, would you say your health is: Fair   In general, would you say your quality of life is: Good   In general, how would you rate your physical health? Fair   In general, how would you rate your mental health, including your mood and your ability to think? Poor   In general, how would you rate your satisfaction with your social activities and relationships? Fair   In general, please rate how well you carry out your usual social activities and roles Fair   To what extent are you able to carry out your everyday physical activities such as walking, climbing stairs, carrying groceries, or moving a chair? A little   How often have you been bothered by emotional problems such as feeling anxious, depressed or irritable? Often   How would you rate your fatigue on average? Moderate   How would you rate your pain on average?   0 = No Pain  to  10 = Worst Imaginable Pain 7   In general, would you say your health is: 2   In general, would you say your quality of life is: 3   In general, how would you " rate your physical health? 2   In general, how would you rate your mental health, including your mood and your ability to think? 1   In general, how would you rate your satisfaction with your social activities and relationships? 2   In general, please rate how well you carry out your usual social activities and roles. (This includes activities at home, at work and in your community, and responsibilities as a parent, child, spouse, employee, friend, etc.) 2   To what extent are you able to carry out your everyday physical activities such as walking, climbing stairs, carrying groceries, or moving a chair? 2   In the past 7 days, how often have you been bothered by emotional problems such as feeling anxious, depressed, or irritable? 4   In the past 7 days, how would you rate your fatigue on average? 3   In the past 7 days, how would you rate your pain on average, where 0 means no pain, and 10 means worst imaginable pain? 7   Global Mental Health Score 8   Global Physical Health Score 9   PROMIS TOTAL - SUBSCORES 17          Andie Bhat, ATC

## 2018-08-11 ASSESSMENT — PATIENT HEALTH QUESTIONNAIRE - PHQ9: SUM OF ALL RESPONSES TO PHQ QUESTIONS 1-9: 17

## 2018-08-21 ENCOUNTER — TELEPHONE (OUTPATIENT)
Dept: ORTHOPEDICS | Facility: CLINIC | Age: 38
End: 2018-08-21

## 2018-08-21 NOTE — TELEPHONE ENCOUNTER
Hanny from our surgery prior authorization team phoned to ask about pt's smoking cessation and psychiatric care.  Pt was phoned by RN.  Pt states she started Chantix and has not smoked since starting the medication.  She has appt with Dr Duong in two weeks when we will obtain a nicotine urine test.  Pt also stated that her primary MD Dr Freya Herndon at the [facility] in [place] has started her on Prozac for her anger disorder.  She will see Dr Herndon again on 9/4/18 for review smoking and psych issues.    Pt sees Dr Duong 9/7/18 to review surgical plan.  Kristie Harris RN